# Patient Record
Sex: MALE | Race: WHITE | URBAN - METROPOLITAN AREA
[De-identification: names, ages, dates, MRNs, and addresses within clinical notes are randomized per-mention and may not be internally consistent; named-entity substitution may affect disease eponyms.]

---

## 2018-03-01 ENCOUNTER — IMPORTED ENCOUNTER (OUTPATIENT)
Dept: URBAN - METROPOLITAN AREA CLINIC 50 | Facility: CLINIC | Age: 78
End: 2018-03-01

## 2018-04-25 ENCOUNTER — IMPORTED ENCOUNTER (OUTPATIENT)
Dept: URBAN - METROPOLITAN AREA CLINIC 50 | Facility: CLINIC | Age: 78
End: 2018-04-25

## 2018-04-26 ENCOUNTER — IMPORTED ENCOUNTER (OUTPATIENT)
Dept: URBAN - METROPOLITAN AREA CLINIC 50 | Facility: CLINIC | Age: 78
End: 2018-04-26

## 2018-04-26 NOTE — PATIENT DISCUSSION
"""Continue to Follow up with Dr Joao Woodruff."" ""Follow dry ARMD without treatment. [facility]/[place]. Patient to call if vision changes or distortion increases. Good diet/do not smoke. """

## 2018-05-16 ENCOUNTER — IMPORTED ENCOUNTER (OUTPATIENT)
Dept: URBAN - METROPOLITAN AREA CLINIC 50 | Facility: CLINIC | Age: 78
End: 2018-05-16

## 2019-09-12 ENCOUNTER — IMPORTED ENCOUNTER (OUTPATIENT)
Dept: URBAN - METROPOLITAN AREA CLINIC 50 | Facility: CLINIC | Age: 79
End: 2019-09-12

## 2020-04-20 ENCOUNTER — IMPORTED ENCOUNTER (OUTPATIENT)
Dept: URBAN - METROPOLITAN AREA CLINIC 50 | Facility: CLINIC | Age: 80
End: 2020-04-20

## 2020-05-07 ENCOUNTER — IMPORTED ENCOUNTER (OUTPATIENT)
Dept: URBAN - METROPOLITAN AREA CLINIC 50 | Facility: CLINIC | Age: 80
End: 2020-05-07

## 2021-03-22 NOTE — PATIENT DISCUSSION
CATARACT, OU - EQUALLY VISUALLY SIGNIFICANT . SCHEDULE SX OS (DOMINATE EYE) THEN LATER IN OS IF VISUAL SYMPTOMS PERSIST.

## 2021-03-22 NOTE — PATIENT DISCUSSION
Surgery Counseling: I have discussed the option of glasses versus cataract surgery versus following. It was explained that when the patients vision no longer meets their visual needs and a glasses prescription does not improve visual symptoms, the option of cataract surgery is a reasonable next step. It was explained that there is no guarantee that removing the cataract will improve their visual symptoms, however; it is believed that the cataract is contributing to the patient's visual impairment and surgery may improve both the visual and functional status of the patient. The risks, benefits and alternatives of surgery were discussed with the patient. After this discussion, the patient desires to proceed with cataract surgery with implantation of an intraocular lens to improve vision.

## 2021-04-07 NOTE — PATIENT DISCUSSION
Continue: prednisol ace-gatiflox-bromfen (prednisol ace-gatiflox-bromfen): drops,suspension: 1-0.5-0.075% 1 drop three times a day as directed opht 03-

## 2021-04-07 NOTE — PATIENT DISCUSSION
Surgery  Counseling: I have discussed the option of glasses versus cataract surgery versus following . It was explained that when vision no longer meets the patient's visual needs and a new prescription for glasses is not likely to improve all of the patient's visual symptoms, the option of cataract surgery is a reasonable next step. It was explained that there is no guarantee that removing the cataract will improve their visual symptoms, however; it is believed that the cataract is contributing to the patient's visual impairment and surgery may significantly improve both the visual and functional status of the patient. The risks, benefits and alternatives of surgery were discussed with the patient. After this discussion, the patient desires to proceed with cataract surgery with implantation of an intraocular lens to improve vision for distance.

## 2021-04-14 NOTE — PATIENT DISCUSSION
S/P PC IOL, OU-  DOING WELL AND STABLE. CONTINUE DROPS AS DIRECTED. TRANSITION CARE TO REFERRING PHYSICIAN.

## 2021-04-18 ASSESSMENT — VISUAL ACUITY
OD_CC: J3
OS_CC: 20/50+
OD_CC: CF @ 4 FT
OS_CC: J3

## 2021-04-18 ASSESSMENT — TONOMETRY
OS_IOP_MMHG: 18
OD_IOP_MMHG: 16
OD_IOP_MMHG: 18
OS_IOP_MMHG: 16

## 2021-10-25 NOTE — PATIENT DISCUSSION
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 Discussed risks and benefits of YAG Laser Capsulotomy. Patient wishes to proceed. Schedule YAG Laser Capsulotomy in the * eye.